# Patient Record
Sex: MALE | Race: WHITE | NOT HISPANIC OR LATINO | Employment: STUDENT | URBAN - METROPOLITAN AREA
[De-identification: names, ages, dates, MRNs, and addresses within clinical notes are randomized per-mention and may not be internally consistent; named-entity substitution may affect disease eponyms.]

---

## 2019-03-01 ENCOUNTER — TELEPHONE (OUTPATIENT)
Dept: NEUROLOGY | Facility: CLINIC | Age: 24
End: 2019-03-01

## 2019-03-01 NOTE — TELEPHONE ENCOUNTER
----- Message from Ruth Alonso sent at 2/28/2019 10:46 AM EST -----  [phone] TD CARDENAS CALLED ABOUT SON BERONICA CARDENAS TO SPEAK WITH YOU ABOUT GETTING IN WITH DR. DAVIS. HE WOULD BE A NEW PATIENT.       I called and left a voicemail for the pt. He is a new pt and needs to be schedule with another provider other than Dr. Davis for Migraine. There is not a release on file for me to speak to his mother at this time.

## 2019-03-01 NOTE — TELEPHONE ENCOUNTER
PLEASE SEE OTHER TELEPHONE NOTE.      PT IS A NEW PT AND THERE ARE NO RELEASES ON FILE TO SPEAK TO ANYONE OTHER THAN THE PT.      ----- Message from Ruth Alonso sent at 2/27/2019 10:31 AM EST -----  MOTHER OF PATIENT, TD, CALLED THIS MORNING ASKING TO SPEAK WITH ENOC AS SHE WAS DIRECTED TO [facility] FROM JJ AT THE [facility] OFFICE. JJ WAS TOLD BY THE MOTHER THAT THE PATIENT HAD AN APPOINTMENT WITH DR. YODER BUT THE MOTHER ONLY WANTS THE PATIENT TO SEE DR. SAGE. PATIENT HAS HAD EIGHT CONCUSSIONS AND IS EXPERIENCING HEADACHES. THE MOTHER ASKED THAT ENOC CALL HER BACK TO GET THE PATIENT SCHEDULED WITH DR. SAGE.

## 2021-03-18 ENCOUNTER — IMMUNIZATION (OUTPATIENT)
Dept: VACCINE CLINIC | Facility: HOSPITAL | Age: 26
End: 2021-03-18

## 2021-03-18 PROCEDURE — 91300 HC SARSCOV02 VAC 30MCG/0.3ML IM: CPT | Performed by: INTERNAL MEDICINE

## 2021-03-18 PROCEDURE — 0001A: CPT | Performed by: INTERNAL MEDICINE

## 2021-04-08 ENCOUNTER — IMMUNIZATION (OUTPATIENT)
Dept: VACCINE CLINIC | Facility: HOSPITAL | Age: 26
End: 2021-04-08

## 2021-04-08 PROCEDURE — 0002A: CPT | Performed by: INTERNAL MEDICINE

## 2021-04-08 PROCEDURE — 91300 HC SARSCOV02 VAC 30MCG/0.3ML IM: CPT | Performed by: INTERNAL MEDICINE
